# Patient Record
Sex: FEMALE | Race: WHITE | Employment: FULL TIME | URBAN - METROPOLITAN AREA
[De-identification: names, ages, dates, MRNs, and addresses within clinical notes are randomized per-mention and may not be internally consistent; named-entity substitution may affect disease eponyms.]

---

## 2022-07-14 ENCOUNTER — HOSPITAL ENCOUNTER (EMERGENCY)
Age: 32
Discharge: HOME | End: 2022-07-14
Attending: EMERGENCY MEDICINE
Payer: COMMERCIAL

## 2022-07-14 VITALS
SYSTOLIC BLOOD PRESSURE: 162 MMHG | WEIGHT: 293 LBS | DIASTOLIC BLOOD PRESSURE: 109 MMHG | OXYGEN SATURATION: 97 % | TEMPERATURE: 99.4 F | RESPIRATION RATE: 14 BRPM | HEART RATE: 93 BPM | BODY MASS INDEX: 44.41 KG/M2 | HEIGHT: 68 IN

## 2022-07-14 DIAGNOSIS — L03.211 FACIAL CELLULITIS: ICD-10-CM

## 2022-07-14 DIAGNOSIS — K04.7 DENTAL ABSCESS: Primary | ICD-10-CM

## 2022-07-14 PROCEDURE — 99283 EMERGENCY DEPT VISIT LOW MDM: CPT

## 2022-07-14 PROCEDURE — 6370000000 HC RX 637 (ALT 250 FOR IP): Performed by: EMERGENCY MEDICINE

## 2022-07-14 PROCEDURE — 10060 I&D ABSCESS SIMPLE/SINGLE: CPT

## 2022-07-14 RX ORDER — IBUPROFEN 800 MG/1
800 TABLET ORAL EVERY 8 HOURS PRN
Qty: 30 TABLET | Refills: 0 | Status: SHIPPED | OUTPATIENT
Start: 2022-07-14

## 2022-07-14 RX ORDER — AMOXICILLIN AND CLAVULANATE POTASSIUM 875; 125 MG/1; MG/1
1 TABLET, FILM COATED ORAL 2 TIMES DAILY
Qty: 20 TABLET | Refills: 0 | Status: SHIPPED | OUTPATIENT
Start: 2022-07-14 | End: 2022-07-24

## 2022-07-14 RX ORDER — AMOXICILLIN AND CLAVULANATE POTASSIUM 875; 125 MG/1; MG/1
1 TABLET, FILM COATED ORAL EVERY 12 HOURS SCHEDULED
Status: DISCONTINUED | OUTPATIENT
Start: 2022-07-14 | End: 2022-07-14 | Stop reason: HOSPADM

## 2022-07-14 RX ADMIN — AMOXICILLIN AND CLAVULANATE POTASSIUM 1 TABLET: 875; 125 TABLET, FILM COATED ORAL at 11:22

## 2022-07-14 ASSESSMENT — PAIN DESCRIPTION - DESCRIPTORS: DESCRIPTORS: ACHING

## 2022-07-14 ASSESSMENT — PAIN - FUNCTIONAL ASSESSMENT: PAIN_FUNCTIONAL_ASSESSMENT: 0-10

## 2022-07-14 ASSESSMENT — PAIN SCALES - GENERAL: PAINLEVEL_OUTOF10: 6

## 2022-07-14 ASSESSMENT — PAIN DESCRIPTION - FREQUENCY: FREQUENCY: CONTINUOUS

## 2022-07-14 ASSESSMENT — PAIN DESCRIPTION - ORIENTATION: ORIENTATION: LEFT

## 2022-07-14 ASSESSMENT — PAIN DESCRIPTION - LOCATION: LOCATION: FACE

## 2022-07-14 NOTE — ED PROVIDER NOTES
Emergency Physician Note        Note Open Time: 10:58 AM EDT    Chief Complaint  Facial Swelling (started with dental pain x 2 days ago. C/o left side facial swelling )       History of Present Illness  Adalid Cervantes is a 36 y.o. female who presents to the ED for facial swelling. Patient reports she noticed tooth pain couple days ago and today significantly swollen. She denies any fevers or any trouble breathing or swallowing. She does not currently have a dentist.    10 systems reviewed, pertinent positives per HPI otherwise noted to be negative    I have reviewed the following from the nursing documentation:      Prior to Admission medications    Not on File       Allergies as of 07/14/2022    (No Known Allergies)       Past Medical History:   Diagnosis Date    Sleep apnea         Surgical History: History reviewed. No pertinent surgical history. Family History:  History reviewed. No pertinent family history. Social History     Socioeconomic History    Marital status: Single     Spouse name: Not on file    Number of children: Not on file    Years of education: Not on file    Highest education level: Not on file   Occupational History    Not on file   Tobacco Use    Smoking status: Never Smoker    Smokeless tobacco: Never Used   Substance and Sexual Activity    Alcohol use: Yes     Comment: occ    Drug use: Never    Sexual activity: Not on file   Other Topics Concern    Not on file   Social History Narrative    Not on file     Social Determinants of Health     Financial Resource Strain:     Difficulty of Paying Living Expenses: Not on file   Food Insecurity:     Worried About Running Out of Food in the Last Year: Not on file    Peggy of Food in the Last Year: Not on file   Transportation Needs:     Lack of Transportation (Medical): Not on file    Lack of Transportation (Non-Medical):  Not on file   Physical Activity:     Days of Exercise per Week: Not on file    Minutes of Exercise per Session: Not on file   Stress:     Feeling of Stress : Not on file   Social Connections:     Frequency of Communication with Friends and Family: Not on file    Frequency of Social Gatherings with Friends and Family: Not on file    Attends Bahai Services: Not on file    Active Member of Clubs or Organizations: Not on file    Attends Club or Organization Meetings: Not on file    Marital Status: Not on file   Intimate Partner Violence:     Fear of Current or Ex-Partner: Not on file    Emotionally Abused: Not on file    Physically Abused: Not on file    Sexually Abused: Not on file   Housing Stability:     Unable to Pay for Housing in the Last Year: Not on file    Number of [place] in the Last Year: Not on file    Unstable Housing in the Last Year: Not on file       Nursing notes reviewed. ED Triage Vitals [07/14/22 1048]   Enc Vitals Group      BP (!) 162/109      Heart Rate 93      Resp 14      Temp 99.4 °F (37.4 °C)      Temp Source Oral      SpO2 97 %      Weight (!) 483 lb 14.6 oz (219.5 kg)      Height 5' 8\" (1.727 m)      Head Circumference       Peak Flow       Pain Score       Pain Loc       Pain Edu? Excl. in [address]? GENERAL:  Awake, alert. Well developed, morbidly obese with no apparent distress. HENT:  Normocephalic, Atraumatic, moist mucous membranes. Left-sided facial edema and erythema primarily over the maxillary prominence and extending towards the lower eyelid on the left. Teeth numbers 10 and 11 are carious and tender with a abscess noted on the buccal aspect of the teeth. EYES:  Pupils equal round and reactive to light, Conjunctiva normal, extraocular movements normal.  Extraocular movements intact and painless. NECK:  No meningeal signs, Supple. EXTREMITIES:  Normal range of motion, no edema, no bony tenderness, no deformity, distal pulses present. SKIN: Warm, dry and intact. NEUROLOGIC: Normal mental status.  Moving all extremities to command. LABS and DIAGNOSTIC RESULTS  PROCEDURES  PROCEDURE:  INCISION & DRAINAGE  Sergey Pate or their surrogate had an opportunity to ask questions, and the risks, benefits, and alternatives were discussed. A local anesthetic was used to completely anesthetize the abscess. A single stab incision was made to keep the abscess open so it will continue to drain. Bloody and purulent material returned. There were no complications during the procedure. MEDICAL DECISION MAKING          I advised the patient to return to the emergency department immediately for any new or worsening symptoms, such as fever or any trouble breathing or swallowing or any spreading rash. The patient voiced agreement and understanding of the treatment plan. No results found for this visit on 07/14/22. I estimate there is LOW risk for a ANAPHYLAXIS, DEEP SPACE INFECTION (e.g., FRANCINES ANGINA OR RETROPHARYNGEAL ABSCESS), EPIGLOTTITIS, MENINGITIS, or AIRWAY COMPROMISE, thus I consider the discharge disposition reasonable. Also, there is no evidence or peritonitis, sepsis, or toxicity. Sergey Pate and I have discussed the diagnosis and risks, and we agree with discharging home to follow-up with their primary doctor. We also discussed returning to the Emergency Department immediately if new or worsening symptoms occur. We have discussed the symptoms which are most concerning (e.g., changing or worsening pain, trouble swallowing or breathing, neck stiffness or fever) that necessitate immediate return. Final Impression    1. Dental abscess    2. Facial cellulitis        Discharge Vital Signs:  Blood pressure (!) 162/109, pulse 93, temperature 99.4 °F (37.4 °C), temperature source Oral, resp. rate 14, height 5' 8\" (1.727 m), weight (!) 483 lb 14.6 oz (219.5 kg), last menstrual period 07/04/2022, SpO2 97 %. Patient was given scripts for the following medications.  I counseled patient how to take these medications. New Prescriptions    AMOXICILLIN-CLAVULANATE (AUGMENTIN) 875-125 MG PER TABLET    Take 1 tablet by mouth 2 times daily for 10 days    IBUPROFEN (ADVIL;MOTRIN) 800 MG TABLET    Take 1 tablet by mouth every 8 hours as needed for Pain       Disposition  Pt is in good condition upon Discharge to home. This chart was generated using the [address] [place] [address] [address]  dictation system. I created this record but it may contain dictation errors.           Kanwal Chang MD  07/14/22 2705

## 2022-07-14 NOTE — ED NOTES
Gave patient discharge instructions. She states, understanding.  Patient discharged to home     Frances Crystal RN  07/14/22 3741